# Patient Record
(demographics unavailable — no encounter records)

---

## 2024-10-17 NOTE — WORK
[Repetitive Strain Injury] : repetitive strain injury [Was the competent medical cause of the injury] : was the competent medical cause of the injury [Are consistent with the injury] : are consistent with the injury [Consistent with my objective findings] : consistent with my objective findings [Total (100%)] : total (100%) [Does not reveal pre-existing condition(s) that may affect treatment/prognosis] : does not reveal pre-existing condition(s) that may affect treatment/prognosis [Cannot return to work because ________] : cannot return to work because [unfilled] [Bending/Twisting] : bending/twisting [Lifting] : lifting [Pulling/Pushing] : pulling/pushing [Simple grasping] : simple grasping [Use of upper extremities] : use of upper extremities [Unknown at this time] : : unknown at this time [Patient] : patient [No] : No [No Rx restrictions] : No Rx restrictions. [I provided the services listed above] :  I provided the services listed above. [Less than Sedentary Work:] :  Less than Sedentary Work: Unable to meet the requirement of Sedentary Work.

## 2024-10-17 NOTE — HISTORY OF PRESENT ILLNESS
[Dull/Aching] : dull/aching [Localized] : localized [Tightness] : tightness [Tingling] : tingling [Constant] : constant [de-identified] : Worked as , was disabled due to carpal tunnel syndrome and herniated lumbar disc. Never opted for hand surgery. Needed follow up visit [] : no [FreeTextEntry1] : BL hands Lower back [FreeTextEntry5] : DOI occurred on 10/5/92 where PT states the pain in her lower back and hands started and has worsened. Orthopedic in [place] retired. Past week, lower back pain has been worsening.
